# Patient Record
Sex: FEMALE | Race: WHITE | Employment: OTHER | URBAN - METROPOLITAN AREA
[De-identification: names, ages, dates, MRNs, and addresses within clinical notes are randomized per-mention and may not be internally consistent; named-entity substitution may affect disease eponyms.]

---

## 2023-10-19 RX ORDER — SODIUM CHLORIDE, SODIUM LACTATE, POTASSIUM CHLORIDE, CALCIUM CHLORIDE 600; 310; 30; 20 MG/100ML; MG/100ML; MG/100ML; MG/100ML
INJECTION, SOLUTION INTRAVENOUS CONTINUOUS
OUTPATIENT
Start: 2023-10-19

## 2023-10-19 NOTE — DISCHARGE INSTRUCTIONS
Preoperative Instructions:    Stop eating solid foods at midnight the night prior to surgery. Stop drinking clear liquids at midnight the night prior to surgery. Arrive at the surgery center (Entrance B) by 6:30 on 11/8/2023  (or as directed by your surgeon's office). Please stop any blood thinning medications as directed by your surgeon or prescribing physician. Failure to stop certain medications may interfere with your scheduled surgery. These may include:  Aspirin, Warfarin (Coumadin), Clopidogrel (Plavix), Ibuprofen (Motrin, Advil), Naproxen (Aleve), Meloxicam (Mobic), Celecoxib (Celebrex), Eliquis, Pradaxa, Xarelto, Effient, Fish Oil, Herbal supplements. You may continue the rest of your medications through the night before surgery unless instructed otherwise. Please take only the following medication(s) the day of surgery with a small sip of water:  None  (hold cozaar/losartan AM of surgery)    PLEASE NOTE:  THE ABOVE (IF ANY) DISCONTINUED MEDS MAY ONLY BE FROM   \"CLEANING UP\" THE MED LIST AND WERE NOT ACTUALLY CANCELLED; SEE CHART FOR DETAILS AND ALWAYS CHECK WITH PRESCRIBING PROVIDER BEFORE DISCONTINUING ANY MEDICATIONS          ____________________________  ____________________________  Signature (Patient)           Signature/date(Provider)      REMINDERS:  ** If you are going home the day of your procedure, you will need a friend or family member to drive you home after your procedure. Your  must be 25years of age or older and able to sign off on your discharge instructions. Taxi cabs or any form of public transportation is not acceptable. ** It is preferable that the friend or family member stay at the hospital throughout your procedure. ** If you are going home the same day as your procedure, someone must remain with you for the first 24 hours after your surgery if you receive anesthesia or sedation.   If you do not have someone to stay with you, your procedure may be with clean freshly washed clothes. The morning of surgery:    Repeat shower following steps above  - using remaining half of CHG soap in bottle. If you have any questions, call the Pre-Admission Testing Unit at [phone]. Day of Surgery/Procedure    As a patient at [address] you can expect quality medical and nursing care that is centered on your individual needs. Our goal is to make your surgical experience as comfortable as possible  . Directions to the [facility] is located at [address] [facility] [address]. Please pull into the Emergency [facility] parking lot (Entrance B) and park in that lot. We also have additional parking across the street. You will enter the facility following the [address] sign. Please stop at the reception desk where you will be checked in by the staff. If you have any questions please call [phone]. Transportation after your procedure. You will need a friend or family member to drive you home after your procedure. Your  must be 25years of age or older and able to sign off on your discharge instructions. Taxi cabs or any form of public transportation is not acceptable. It is preferable that the friend or family member stay at the hospital throughout your procedure. Someone must remain at home with you for the first 24 hours after your surgery if you receive anesthesia or sedation. If you do not have someone to stay with you, your procedure may be cancelled. Patient Instructions    If you are having any type of anesthesia you are to have nothing to eat or drink after midnight the night before your surgery. This includes gum, mints, water or smoking or chewing tobacco.  The only exception to this is a small sip of water to take with any morning dose of heart, blood pressure, or seizure medications.     Bring a list of all medications you take, along with the

## 2023-10-25 ENCOUNTER — HOSPITAL ENCOUNTER (OUTPATIENT)
Dept: PREADMISSION TESTING | Age: 83
Discharge: HOME | End: 2023-10-25
Payer: MEDICARE

## 2023-10-25 ENCOUNTER — HOSPITAL ENCOUNTER (OUTPATIENT)
Dept: GENERAL RADIOLOGY | Age: 83
Discharge: HOME | End: 2023-10-27
Payer: MEDICARE

## 2023-10-25 VITALS
DIASTOLIC BLOOD PRESSURE: 85 MMHG | BODY MASS INDEX: 26.22 KG/M2 | HEART RATE: 65 BPM | RESPIRATION RATE: 16 BRPM | SYSTOLIC BLOOD PRESSURE: 142 MMHG | WEIGHT: 148 LBS | TEMPERATURE: 97.4 F | HEIGHT: 63 IN | OXYGEN SATURATION: 97 %

## 2023-10-25 LAB
ALBUMIN SERPL-MCNC: 4.2 G/DL (ref 3.5–5.2)
ALBUMIN/GLOB SERPL: 1.6 {RATIO} (ref 1–2.5)
ALP SERPL-CCNC: 113 U/L (ref 35–104)
ALT SERPL-CCNC: 6 U/L (ref 5–33)
ANION GAP SERPL CALCULATED.3IONS-SCNC: 10 MMOL/L (ref 9–17)
AST SERPL-CCNC: 12 U/L
BILIRUB SERPL-MCNC: 0.9 MG/DL (ref 0.3–1.2)
BUN SERPL-MCNC: 16 MG/DL (ref 8–23)
CALCIUM SERPL-MCNC: 10.1 MG/DL (ref 8.6–10.4)
CHLORIDE SERPL-SCNC: 101 MMOL/L (ref 98–107)
CO2 SERPL-SCNC: 23 MMOL/L (ref 20–31)
CREAT SERPL-MCNC: 0.6 MG/DL (ref 0.5–0.9)
ERYTHROCYTE [DISTWIDTH] IN BLOOD BY AUTOMATED COUNT: 13.2 % (ref 11.8–14.4)
GFR SERPL CREATININE-BSD FRML MDRD: >60 ML/MIN/1.73M2
GLUCOSE SERPL-MCNC: 96 MG/DL (ref 70–99)
HCT VFR BLD AUTO: 44 % (ref 36.3–47.1)
HGB BLD-MCNC: 14.3 G/DL (ref 11.9–15.1)
MCH RBC QN AUTO: 31.4 PG (ref 25.2–33.5)
MCHC RBC AUTO-ENTMCNC: 32.5 G/DL (ref 28.4–34.8)
MCV RBC AUTO: 96.7 FL (ref 82.6–102.9)
NRBC BLD-RTO: 0 PER 100 WBC
PLATELET # BLD AUTO: 228 K/UL (ref 138–453)
PMV BLD AUTO: 9.9 FL (ref 8.1–13.5)
POTASSIUM SERPL-SCNC: 4.6 MMOL/L (ref 3.7–5.3)
PROT SERPL-MCNC: 6.9 G/DL (ref 6.4–8.3)
RBC # BLD AUTO: 4.55 M/UL (ref 3.95–5.11)
SODIUM SERPL-SCNC: 134 MMOL/L (ref 135–144)
WBC OTHER # BLD: 6.6 K/UL (ref 3.5–11.3)

## 2023-10-25 PROCEDURE — 85027 COMPLETE CBC AUTOMATED: CPT

## 2023-10-25 PROCEDURE — 71046 X-RAY EXAM CHEST 2 VIEWS: CPT

## 2023-10-25 PROCEDURE — 36415 COLL VENOUS BLD VENIPUNCTURE: CPT

## 2023-10-25 PROCEDURE — 80053 COMPREHEN METABOLIC PANEL: CPT

## 2023-10-25 PROCEDURE — 93005 ELECTROCARDIOGRAM TRACING: CPT | Performed by: ANESTHESIOLOGY

## 2023-10-25 RX ORDER — LOSARTAN POTASSIUM 50 MG/1
50 TABLET ORAL DAILY
COMMUNITY
Start: 2022-03-08

## 2023-10-25 RX ORDER — ATORVASTATIN CALCIUM 10 MG/1
10 TABLET, FILM COATED ORAL DAILY
COMMUNITY
Start: 2018-02-11

## 2023-10-25 RX ORDER — ACETAMINOPHEN 500 MG
500 TABLET ORAL EVERY 6 HOURS PRN
COMMUNITY

## 2023-10-25 RX ORDER — IBUPROFEN 800 MG/1
800 TABLET ORAL EVERY 6 HOURS PRN
COMMUNITY

## 2023-10-25 ASSESSMENT — PAIN SCALES - GENERAL: PAINLEVEL_OUTOF10: 4

## 2023-10-25 ASSESSMENT — PAIN DESCRIPTION - ORIENTATION: ORIENTATION: RIGHT

## 2023-10-25 ASSESSMENT — PAIN DESCRIPTION - PAIN TYPE: TYPE: CHRONIC PAIN

## 2023-10-25 ASSESSMENT — PAIN DESCRIPTION - DESCRIPTORS: DESCRIPTORS: ACHING

## 2023-10-25 ASSESSMENT — PAIN DESCRIPTION - FREQUENCY: FREQUENCY: CONTINUOUS

## 2023-10-25 ASSESSMENT — PAIN DESCRIPTION - LOCATION: LOCATION: HAND

## 2023-10-25 NOTE — PROGRESS NOTES
Anesthesia Focused Assessment    STOP-BANG Sleep Apnea Questionnaire    SNORE loudly (heard through closed doors)? No  TIRED, fatigued, sleepy during daytime? No  OBSERVED stopping breathing during sleep? No  High blood PRESSURE being treated? Yes    BMI over 35? No  AGE over 48? Yes  NECK circumference over 16\"? No  GENDER (male)? No             Total 2  High risk 5-8  Intermediate risk 3-4  Low risk 0-2    Obstructive Sleep Apnea: denies  If YES, machine used: no     Type 1 DM:   no  T2DM:  no    Coronary Artery Disease:  no  Hypertension:  yes    Active smoker:  no  Drinks Alcohol:  socially    Dentition: benign    Defib / AICD / Pacemaker: no      Renal Failure/dialysis:  no    Patient was evaluated in PAT & anesthesia guidelines were applied. NPO guidelines, medication instructions and scheduled arrival time were reviewed with patient. I advised patient to please contact the surgeon's office, ahead of time if possible, if any new signs or symptoms of illness, infection, rash, etc    Hx of anesthesia complications:  no  Family hx of anesthesia complications:  no                                                                                                                     Patient is active, without cardiac or pulmonary complaints. She has underwent total knee arthroplasty x 2,  most recently 4/8586, without complication. Most recent office notes from PCP were requested for chart, patient says she is aware of upcoming surgery.     Ashia Russell PA-C  10/25/23  10:02 AM

## 2023-10-27 LAB
EKG ATRIAL RATE: 57 BPM
EKG P AXIS: 30 DEGREES
EKG P-R INTERVAL: 180 MS
EKG Q-T INTERVAL: 412 MS
EKG QRS DURATION: 98 MS
EKG QTC CALCULATION (BAZETT): 401 MS
EKG R AXIS: -6 DEGREES
EKG T AXIS: 85 DEGREES
EKG VENTRICULAR RATE: 57 BPM

## 2023-10-27 PROCEDURE — 93010 ELECTROCARDIOGRAM REPORT: CPT | Performed by: INTERNAL MEDICINE

## 2023-11-07 ENCOUNTER — ANESTHESIA EVENT (OUTPATIENT)
Dept: OPERATING ROOM | Age: 83
End: 2023-11-07
Payer: MEDICARE

## 2023-11-07 PROBLEM — Z17.0 MALIGNANT NEOPLASM OF UPPER-INNER QUADRANT OF LEFT BREAST IN FEMALE, ESTROGEN RECEPTOR POSITIVE (HCC): Chronic | Status: ACTIVE | Noted: 2023-11-07

## 2023-11-07 PROBLEM — C50.212 MALIGNANT NEOPLASM OF UPPER-INNER QUADRANT OF LEFT BREAST IN FEMALE, ESTROGEN RECEPTOR POSITIVE (HCC): Chronic | Status: ACTIVE | Noted: 2023-11-07

## 2023-11-07 ASSESSMENT — ENCOUNTER SYMPTOMS
APNEA: 0
CONSTIPATION: 0
EYE PAIN: 0
PHOTOPHOBIA: 0
EYE DISCHARGE: 0
FACIAL SWELLING: 0
ANAL BLEEDING: 0
CHOKING: 0
COLOR CHANGE: 0
ABDOMINAL DISTENTION: 0
COUGH: 0

## 2023-11-07 NOTE — H&P
General Surgery  H&P        PATIENT NAME: Sofia Canchola   YOB: 1940    ADMISSION DATE: 11/8/2023    Admitting Physician: Dr Robbie Cabrera: 11/7/2023    Reason for admission:  l breast cancer    Chief complaint: same    HISTORY OF PRESENT ILLNESS:  The patient is a 81 y.o. female  who presents after biopsy of upper inner quadrant left breast mass demonstrated infiltrating ductal carcinoma with lobular features which was ER/NV positive measuring 2.3 cm in greatest dimension. Patient is not clinically jaundiced and has no new chronic or persistent bony pain. Patient's weight has been stable. Patient admitted for left modified radical mastectomy. Past Medical History:        Diagnosis Date    Back pain     GERD (gastroesophageal reflux disease)     Glaucoma     HL (hearing loss)     Bilateral, doesn't wear hearing aides all the time.     Hyperlipidemia     Hypertension     Lobular breast cancer, left (HCC)     OA (osteoarthritis)     PAC (premature atrial contraction)     Posterior vitreous detachment of both eyes     Primary open angle glaucoma of both eyes, mild stage     Under care of team 10/25/2023    PCP: Dr. Evens Shrestha, [address] [place] [address], last visit 8/2023    Under care of team 10/25/2023    Ortho: Keesha Warren, [facility], last visit 2023    Under care of team 10/25/2023    Pain Management: Dr. Brandie Awan, [facility], last visit 2023    Wears glasses        Past Surgical History:        Procedure Laterality Date    ABDOMINAL EXPLORATION SURGERY  1976    CATARACT EXTRACTION W/ INTRAOCULAR LENS IMPLANT Left 05/12/2022    CATARACT EXTRACTION W/ INTRAOCULAR LENS IMPLANT Right 05/26/2022    DILATION AND CURETTAGE OF UTERUS  1985    HYSTERECTOMY (CERVIX STATUS UNKNOWN)  [address] Left 06/16/2022    TOTAL KNEE ARTHROPLASTY Right 01/24/2023    WISDOM TOOTH EXTRACTION         Medications Prior to Admission: See epic

## 2023-11-08 ENCOUNTER — HOSPITAL ENCOUNTER (OUTPATIENT)
Age: 83
Setting detail: OUTPATIENT SURGERY
Discharge: HOME | End: 2023-11-08
Attending: SURGERY | Admitting: SURGERY
Payer: MEDICARE

## 2023-11-08 ENCOUNTER — ANESTHESIA (OUTPATIENT)
Dept: OPERATING ROOM | Age: 83
End: 2023-11-08
Payer: MEDICARE

## 2023-11-08 VITALS
RESPIRATION RATE: 14 BRPM | OXYGEN SATURATION: 98 % | SYSTOLIC BLOOD PRESSURE: 131 MMHG | TEMPERATURE: 97.4 F | HEIGHT: 63 IN | HEART RATE: 67 BPM | DIASTOLIC BLOOD PRESSURE: 59 MMHG | WEIGHT: 148 LBS | BODY MASS INDEX: 26.22 KG/M2

## 2023-11-08 DIAGNOSIS — C50.912 LOBULAR BREAST CANCER, LEFT (HCC): ICD-10-CM

## 2023-11-08 PROCEDURE — 88342 IMHCHEM/IMCYTCHM 1ST ANTB: CPT

## 2023-11-08 PROCEDURE — 2500000003 HC RX 250 WO HCPCS: Performed by: SURGERY

## 2023-11-08 PROCEDURE — 3600000004 HC SURGERY LEVEL 4 BASE: Performed by: SURGERY

## 2023-11-08 PROCEDURE — 6370000000 HC RX 637 (ALT 250 FOR IP): Performed by: SURGERY

## 2023-11-08 PROCEDURE — 2500000003 HC RX 250 WO HCPCS

## 2023-11-08 PROCEDURE — 6360000002 HC RX W HCPCS

## 2023-11-08 PROCEDURE — 3700000000 HC ANESTHESIA ATTENDED CARE: Performed by: SURGERY

## 2023-11-08 PROCEDURE — 2580000003 HC RX 258: Performed by: SURGERY

## 2023-11-08 PROCEDURE — 3600000014 HC SURGERY LEVEL 4 ADDTL 15MIN: Performed by: SURGERY

## 2023-11-08 PROCEDURE — 88341 IMHCHEM/IMCYTCHM EA ADD ANTB: CPT

## 2023-11-08 PROCEDURE — 7100000001 HC PACU RECOVERY - ADDTL 15 MIN: Performed by: SURGERY

## 2023-11-08 PROCEDURE — 2580000003 HC RX 258: Performed by: ANESTHESIOLOGY

## 2023-11-08 PROCEDURE — 3700000001 HC ADD 15 MINUTES (ANESTHESIA): Performed by: SURGERY

## 2023-11-08 PROCEDURE — 88309 TISSUE EXAM BY PATHOLOGIST: CPT

## 2023-11-08 PROCEDURE — 6360000002 HC RX W HCPCS: Performed by: ANESTHESIOLOGY

## 2023-11-08 PROCEDURE — 7100000000 HC PACU RECOVERY - FIRST 15 MIN: Performed by: SURGERY

## 2023-11-08 PROCEDURE — 7100000010 HC PHASE II RECOVERY - FIRST 15 MIN: Performed by: SURGERY

## 2023-11-08 PROCEDURE — 2709999900 HC NON-CHARGEABLE SUPPLY: Performed by: SURGERY

## 2023-11-08 PROCEDURE — 6360000002 HC RX W HCPCS: Performed by: SURGERY

## 2023-11-08 RX ORDER — SODIUM CHLORIDE, SODIUM LACTATE, POTASSIUM CHLORIDE, CALCIUM CHLORIDE 600; 310; 30; 20 MG/100ML; MG/100ML; MG/100ML; MG/100ML
INJECTION, SOLUTION INTRAVENOUS CONTINUOUS
Status: DISCONTINUED | OUTPATIENT
Start: 2023-11-08 | End: 2023-11-08 | Stop reason: HOSPADM

## 2023-11-08 RX ORDER — LIDOCAINE HYDROCHLORIDE 10 MG/ML
INJECTION, SOLUTION EPIDURAL; INFILTRATION; INTRACAUDAL; PERINEURAL PRN
Status: DISCONTINUED | OUTPATIENT
Start: 2023-11-08 | End: 2023-11-08 | Stop reason: SDUPTHER

## 2023-11-08 RX ORDER — FENTANYL CITRATE 50 UG/ML
INJECTION, SOLUTION INTRAMUSCULAR; INTRAVENOUS PRN
Status: DISCONTINUED | OUTPATIENT
Start: 2023-11-08 | End: 2023-11-08 | Stop reason: SDUPTHER

## 2023-11-08 RX ORDER — BUPIVACAINE HYDROCHLORIDE AND EPINEPHRINE 5; 5 MG/ML; UG/ML
INJECTION, SOLUTION PERINEURAL PRN
Status: DISCONTINUED | OUTPATIENT
Start: 2023-11-08 | End: 2023-11-08 | Stop reason: ALTCHOICE

## 2023-11-08 RX ORDER — ACETAMINOPHEN 325 MG/1
650 TABLET ORAL EVERY 4 HOURS PRN
Status: DISCONTINUED | OUTPATIENT
Start: 2023-11-08 | End: 2023-11-08 | Stop reason: HOSPADM

## 2023-11-08 RX ORDER — KETOROLAC TROMETHAMINE 30 MG/ML
INJECTION, SOLUTION INTRAMUSCULAR; INTRAVENOUS PRN
Status: DISCONTINUED | OUTPATIENT
Start: 2023-11-08 | End: 2023-11-08 | Stop reason: SDUPTHER

## 2023-11-08 RX ORDER — LABETALOL HYDROCHLORIDE 5 MG/ML
10 INJECTION, SOLUTION INTRAVENOUS
Status: DISCONTINUED | OUTPATIENT
Start: 2023-11-08 | End: 2023-11-08 | Stop reason: HOSPADM

## 2023-11-08 RX ORDER — SODIUM CHLORIDE 9 MG/ML
INJECTION, SOLUTION INTRAVENOUS PRN
Status: DISCONTINUED | OUTPATIENT
Start: 2023-11-08 | End: 2023-11-08 | Stop reason: HOSPADM

## 2023-11-08 RX ORDER — SODIUM CHLORIDE 0.9 % (FLUSH) 0.9 %
5-40 SYRINGE (ML) INJECTION PRN
Status: DISCONTINUED | OUTPATIENT
Start: 2023-11-08 | End: 2023-11-08 | Stop reason: HOSPADM

## 2023-11-08 RX ORDER — PROPOFOL 10 MG/ML
INJECTION, EMULSION INTRAVENOUS PRN
Status: DISCONTINUED | OUTPATIENT
Start: 2023-11-08 | End: 2023-11-08 | Stop reason: SDUPTHER

## 2023-11-08 RX ORDER — ONDANSETRON 2 MG/ML
INJECTION INTRAMUSCULAR; INTRAVENOUS PRN
Status: DISCONTINUED | OUTPATIENT
Start: 2023-11-08 | End: 2023-11-08 | Stop reason: SDUPTHER

## 2023-11-08 RX ORDER — DEXAMETHASONE SODIUM PHOSPHATE 10 MG/ML
INJECTION INTRAMUSCULAR; INTRAVENOUS PRN
Status: DISCONTINUED | OUTPATIENT
Start: 2023-11-08 | End: 2023-11-08 | Stop reason: SDUPTHER

## 2023-11-08 RX ORDER — HYDRALAZINE HYDROCHLORIDE 20 MG/ML
10 INJECTION INTRAMUSCULAR; INTRAVENOUS
Status: DISCONTINUED | OUTPATIENT
Start: 2023-11-08 | End: 2023-11-08 | Stop reason: HOSPADM

## 2023-11-08 RX ORDER — ONDANSETRON 2 MG/ML
4 INJECTION INTRAMUSCULAR; INTRAVENOUS
Status: DISCONTINUED | OUTPATIENT
Start: 2023-11-08 | End: 2023-11-08 | Stop reason: HOSPADM

## 2023-11-08 RX ORDER — MAGNESIUM HYDROXIDE 1200 MG/15ML
LIQUID ORAL PRN
Status: DISCONTINUED | OUTPATIENT
Start: 2023-11-08 | End: 2023-11-08 | Stop reason: HOSPADM

## 2023-11-08 RX ORDER — ROCURONIUM BROMIDE 10 MG/ML
INJECTION, SOLUTION INTRAVENOUS PRN
Status: DISCONTINUED | OUTPATIENT
Start: 2023-11-08 | End: 2023-11-08 | Stop reason: SDUPTHER

## 2023-11-08 RX ORDER — SODIUM CHLORIDE 0.9 % (FLUSH) 0.9 %
5-40 SYRINGE (ML) INJECTION EVERY 12 HOURS SCHEDULED
Status: DISCONTINUED | OUTPATIENT
Start: 2023-11-08 | End: 2023-11-08 | Stop reason: HOSPADM

## 2023-11-08 RX ADMIN — ONDANSETRON 4 MG: 2 INJECTION INTRAMUSCULAR; INTRAVENOUS at 09:13

## 2023-11-08 RX ADMIN — ACETAMINOPHEN 650 MG: 325 TABLET ORAL at 10:24

## 2023-11-08 RX ADMIN — FENTANYL CITRATE 25 MCG: 50 INJECTION, SOLUTION INTRAMUSCULAR; INTRAVENOUS at 08:42

## 2023-11-08 RX ADMIN — Medication 2000 MG: at 08:19

## 2023-11-08 RX ADMIN — FENTANYL CITRATE 50 MCG: 50 INJECTION, SOLUTION INTRAMUSCULAR; INTRAVENOUS at 08:08

## 2023-11-08 RX ADMIN — HYDROMORPHONE HYDROCHLORIDE 0.25 MG: 1 INJECTION, SOLUTION INTRAMUSCULAR; INTRAVENOUS; SUBCUTANEOUS at 10:01

## 2023-11-08 RX ADMIN — SODIUM CHLORIDE, POTASSIUM CHLORIDE, SODIUM LACTATE AND CALCIUM CHLORIDE: 600; 310; 30; 20 INJECTION, SOLUTION INTRAVENOUS at 07:02

## 2023-11-08 RX ADMIN — SUGAMMADEX 200 MG: 100 INJECTION, SOLUTION INTRAVENOUS at 09:23

## 2023-11-08 RX ADMIN — DEXAMETHASONE SODIUM PHOSPHATE 5 MG: 10 INJECTION INTRAMUSCULAR; INTRAVENOUS at 08:18

## 2023-11-08 RX ADMIN — ROCURONIUM BROMIDE 10 MG: 10 INJECTION, SOLUTION INTRAVENOUS at 08:42

## 2023-11-08 RX ADMIN — ROCURONIUM BROMIDE 50 MG: 10 INJECTION, SOLUTION INTRAVENOUS at 08:08

## 2023-11-08 RX ADMIN — PROPOFOL 120 MG: 10 INJECTION, EMULSION INTRAVENOUS at 08:08

## 2023-11-08 RX ADMIN — FENTANYL CITRATE 25 MCG: 50 INJECTION, SOLUTION INTRAMUSCULAR; INTRAVENOUS at 09:24

## 2023-11-08 RX ADMIN — LIDOCAINE HYDROCHLORIDE 50 MG: 10 INJECTION, SOLUTION EPIDURAL; INFILTRATION; INTRACAUDAL; PERINEURAL at 08:08

## 2023-11-08 RX ADMIN — KETOROLAC TROMETHAMINE 15 MG: 30 INJECTION, SOLUTION INTRAMUSCULAR; INTRAVENOUS at 09:18

## 2023-11-08 ASSESSMENT — PAIN - FUNCTIONAL ASSESSMENT: PAIN_FUNCTIONAL_ASSESSMENT: NONE - DENIES PAIN

## 2023-11-08 ASSESSMENT — PAIN DESCRIPTION - DESCRIPTORS
DESCRIPTORS: ACHING
DESCRIPTORS: ACHING

## 2023-11-08 ASSESSMENT — PAIN DESCRIPTION - ORIENTATION: ORIENTATION: LEFT

## 2023-11-08 ASSESSMENT — PAIN DESCRIPTION - LOCATION
LOCATION: BREAST
LOCATION: BREAST

## 2023-11-08 ASSESSMENT — PAIN SCALES - GENERAL
PAINLEVEL_OUTOF10: 5
PAINLEVEL_OUTOF10: 4

## 2023-11-08 NOTE — OP NOTE
Operative Note      Patient: Niall Aguilar  YOB: 1940  MRN: [id number]    Date of Procedure: 11/8/2023    Pre-Op Diagnosis Codes:     * Lobular breast cancer, left ([address]) [C50.912]    Post-Op Diagnosis: Same       Procedure(s):  MODIFIED RADICAL MASTECTOMY    Surgeon(s):  Bethany Milian DO    Assistant:   * No surgical staff found *    Anesthesia: General, 0.5% Marcaine with epinephrine    Estimated Blood Loss (mL): Minimal    Complications: None    Specimens:   ID Type Source Tests Collected by Time Destination   A : LEFT BREAST AND AXILLARY CONTENT, SHORT STICH SUPERIOR LONG STICH LATERAL Tissue Breast SURGICAL PATHOLOGY Bethany Milian DO 11/8/2023 0905        Implants:  None      Drains:   Closed/Suction Drain Left Breast Bulb (Active)       Findings: Upper upper inner quadrant left breast cancer    Axillary Lymph Node Dissection for Breast Cancer - Left  Operation performed with curative intent. Yes   Resection was performed within the boundaries of the axillary vein, chest wall (serratus anterior), and latissimus dorsi. Yes   Nerves identified and preserved during dissection (select all that apply). Long thoracic nerve, Thoracodorsal nerve, and Branches of the intercostobrachial nerves   Level III nodes were removed. No            Detailed Description of Procedure:   After the left breast was prepped and draped in usual sterile fashion, a transverse elliptical incision was measured and marked centered around the nipple measuring 15 x 10 cm. Superior and inferior incisions were then made through the marked lines along the ellipse to the level of the subcutaneous breast tissue interface. Hemostasis was accomplished throughout the procedure using electrocautery and superior flap was then raised dissecting between the subcutaneous tissue and the breast tissue from the medial aspect of the sternum superiorly to the clavicle lateral to the mid axillary line.   Hemostasis was found to be complete and the inferior flap was then elevated and dissected to the rectus sheath inferiorly medially to the sternum and laterally to the midaxillary line with good hemostasis. Breast was then dissected from medial to lateral fashion using sharp and blunt dissection electrocautery for hemostasis taking care to cauterize several perforating vessels with good hemostasis to the level of the mid axillary line. At this point the pectoralis major minor muscles were elevated to reveal the axillary fascia which was incised sharply and the axillary vessels were identified along with the inferior and medial portions of the axilla demonstrating the long thoracic and thoracodorsal nerves. Lymphatic tissue was then grasped and circumferentially dissected using sharp and blunt dissection electrocautery for hemostasis and dissected away from the vasculature and the visualized nerves until was freed in continuity with the tail of the breast.  Breast was then detached from its lateral remaining attachments using sharp and blunt dissection and electrocautery for hemostasis until was freed entirely and marked laterally with a long 3-0 nylon and superiorly with a short 3-0 nylon. Specimen was then handed off for pathologic examination. Hemostasis was found to be complete and the bed of the dissection was then irrigated with sterile water. Drain was placed near the lateral portion of the incision and placed on top of the pectoralis major muscle. This was then sutured in position with 3-0 nylon and the wound was approximated with interrupted sutures of 2-0 Vicryl and skin was closed using staples. Wound was then anesthetized using half percent Marcaine with epinephrine and sterile dressing was then placed and all sponge needle instrument counts were reported correct at the end of the case. Patient was taken to the recovery room in stable condition having tolerated procedure well.     Electronically signed by Loli Malik DO on

## 2023-11-08 NOTE — DISCHARGE INSTRUCTIONS
Empty strip & record MARIBELL drain output am & PM. Remove dressing & shower in am. Activity as tolerated. Heating pad to left chest as tolerated. F/u Dr Coe Organ 10 days [phone]. Tylenol/ibuprofen for pain      Call your doctor for the following:   Chills   Temperature greater than 101   Pain that is not tolerable despite taking pain medicine as ordered   There is increased swelling, redness or warmth at surgical site   There is increased drainage or bleeding from surgical site   Do not remove surgical dressing unless instructed to do so by your surgeon           No alcoholic beverages, no driving or operating machinery, no making important decisions for 24 hours. You may have a normal diet but should eat lightly day of surgery. Drink plenty of fluids.   Urinate within 8 hours after surgery, if unable to urinate call your doctor

## 2023-11-08 NOTE — PROGRESS NOTES
Dr. Liliana Salazar notified via [facility] patient complaining of slight numbness and tingling to left wrist. Dr. Akhil Meiere back to writer stating inflammation from surgery, will improve, patient okay to discharge home.

## 2023-11-11 LAB — SURGICAL PATHOLOGY REPORT: NORMAL

## (undated) DEVICE — SYRINGE, LUER LOCK, 10ML: Brand: MEDLINE

## (undated) DEVICE — SUTURE VCRL SZ 2-0 L27IN ABSRB UD L26MM SH 1/2 CIR J417H

## (undated) DEVICE — 1LYRTR 16FR10ML100%SIL UMS SNP: Brand: MEDLINE INDUSTRIES, INC.

## (undated) DEVICE — RESERVOIR,SUCTION,100CC,SILICONE: Brand: MEDLINE

## (undated) DEVICE — SPONGE LAP W18XL18IN WHT COT 4 PLY FLD STRUNG RADPQ DISP ST 2 PER PACK

## (undated) DEVICE — SUTURE MCRYL SZ 4-0 L18IN ABSRB UD L16MM PC-3 3/8 CIR PRIM Y845G

## (undated) DEVICE — BLADE,CARBON-STEEL,15,STRL,DISPOSABLE: Brand: MEDLINE

## (undated) DEVICE — TOWEL,OR,DSP,ST,NATURAL,DLX,4/PK,20PK/CS: Brand: MEDLINE

## (undated) DEVICE — DRAIN,WOUND,15FR,3/16,FULL-FLUTED: Brand: MEDLINE

## (undated) DEVICE — PACK PROCEDURE SURG GEN MIN SVMMC

## (undated) DEVICE — GLOVE ORANGE PI 7 1/2   MSG9075

## (undated) DEVICE — CONTAINER,SPECIMEN,4OZ,OR STRL: Brand: MEDLINE

## (undated) DEVICE — BRA COMPR LG NYL LYCRA SPANDEX WHT CURAD

## (undated) DEVICE — DRAPE,REIN 53X77,STERILE: Brand: MEDLINE

## (undated) DEVICE — GARMENT,MEDLINE,DVT,INT,CALF,MED, GEN2: Brand: MEDLINE

## (undated) DEVICE — SUTURE NONABSORBABLE MONOFILAMENT 3-0 PS-1 18 IN BLK ETHILON 1663H

## (undated) DEVICE — APPLICATOR MEDICATED 26 CC SOLUTION HI LT ORNG CHLORAPREP

## (undated) DEVICE — PAD,ABDOMINAL,5"X9",ST,LF,25/BX: Brand: MEDLINE INDUSTRIES, INC.

## (undated) DEVICE — SUTURE VCRL SZ 3-0 L27IN ABSRB UD L26MM SH 1/2 CIR J416H

## (undated) DEVICE — Device

## (undated) DEVICE — CLEANER,CAUTERY TIP,2X2",STERILE: Brand: MEDLINE

## (undated) DEVICE — BANDAGE,GAUZE,BULKEE II,4.5"X4.1YD,STRL: Brand: MEDLINE

## (undated) DEVICE — STRAP,POSITIONING,KNEE/BODY,FOAM,4X60": Brand: MEDLINE